# Patient Record
Sex: FEMALE | Race: WHITE | URBAN - METROPOLITAN AREA
[De-identification: names, ages, dates, MRNs, and addresses within clinical notes are randomized per-mention and may not be internally consistent; named-entity substitution may affect disease eponyms.]

---

## 2017-01-01 ENCOUNTER — TELEPHONE (OUTPATIENT)
Dept: FAMILY MEDICINE | Facility: CLINIC | Age: 77
End: 2017-01-01

## 2017-01-01 ENCOUNTER — OFFICE VISIT (OUTPATIENT)
Dept: FAMILY MEDICINE | Facility: CLINIC | Age: 77
End: 2017-01-01
Payer: MEDICARE

## 2017-01-01 VITALS
OXYGEN SATURATION: 96 % | HEART RATE: 76 BPM | TEMPERATURE: 98.8 F | BODY MASS INDEX: 18.18 KG/M2 | WEIGHT: 106.5 LBS | HEIGHT: 64 IN | SYSTOLIC BLOOD PRESSURE: 118 MMHG | DIASTOLIC BLOOD PRESSURE: 90 MMHG

## 2017-01-01 DIAGNOSIS — K21.9 GASTROESOPHAGEAL REFLUX DISEASE WITHOUT ESOPHAGITIS: ICD-10-CM

## 2017-01-01 DIAGNOSIS — F32.1 MODERATE SINGLE CURRENT EPISODE OF MAJOR DEPRESSIVE DISORDER (H): ICD-10-CM

## 2017-01-01 DIAGNOSIS — G89.3 CANCER ASSOCIATED PAIN: ICD-10-CM

## 2017-01-01 DIAGNOSIS — I48.91 ATRIAL FIBRILLATION, UNSPECIFIED TYPE (H): ICD-10-CM

## 2017-01-01 DIAGNOSIS — M79.662 PAIN OF LEFT LOWER LEG: ICD-10-CM

## 2017-01-01 DIAGNOSIS — E03.9 HYPOTHYROIDISM, UNSPECIFIED TYPE: ICD-10-CM

## 2017-01-01 DIAGNOSIS — K59.03 DRUG-INDUCED CONSTIPATION: ICD-10-CM

## 2017-01-01 DIAGNOSIS — F41.9 ANXIETY: ICD-10-CM

## 2017-01-01 DIAGNOSIS — G47.00 INSOMNIA, UNSPECIFIED TYPE: ICD-10-CM

## 2017-01-01 DIAGNOSIS — C34.92 MALIGNANT NEOPLASM OF LEFT LUNG, UNSPECIFIED PART OF LUNG (H): Primary | ICD-10-CM

## 2017-01-01 PROCEDURE — 99204 OFFICE O/P NEW MOD 45 MIN: CPT | Performed by: FAMILY MEDICINE

## 2017-01-01 RX ORDER — LOVASTATIN 40 MG
40 TABLET ORAL 2 TIMES DAILY WITH MEALS
COMMUNITY
End: 2017-01-01

## 2017-01-01 RX ORDER — ESOMEPRAZOLE MAGNESIUM 40 MG/1
40 CAPSULE, DELAYED RELEASE ORAL
COMMUNITY

## 2017-01-01 RX ORDER — PAROXETINE 30 MG/1
30 TABLET, FILM COATED ORAL EVERY MORNING
COMMUNITY

## 2017-01-01 RX ORDER — OXYCODONE AND ACETAMINOPHEN 7.5; 325 MG/1; MG/1
1 TABLET ORAL EVERY 6 HOURS PRN
Qty: 120 TABLET | Refills: 0 | Status: SHIPPED | OUTPATIENT
Start: 2017-01-01

## 2017-01-01 RX ORDER — LEVOTHYROXINE SODIUM 137 UG/1
137 TABLET ORAL DAILY
COMMUNITY

## 2017-01-01 RX ORDER — MIRTAZAPINE 15 MG/1
15 TABLET, FILM COATED ORAL AT BEDTIME
Qty: 30 TABLET | Refills: 1 | Status: SHIPPED | OUTPATIENT
Start: 2017-01-01

## 2017-01-01 RX ORDER — ALBUTEROL SULFATE 90 UG/1
2 AEROSOL, METERED RESPIRATORY (INHALATION) EVERY 4 HOURS PRN
COMMUNITY

## 2017-01-01 RX ORDER — MORPHINE SULFATE 15 MG/1
15 TABLET ORAL EVERY 12 HOURS
COMMUNITY
End: 2017-01-01

## 2017-01-01 RX ORDER — GABAPENTIN 300 MG/1
300 CAPSULE ORAL EVERY 8 HOURS
COMMUNITY

## 2017-01-01 RX ORDER — AMOXICILLIN 250 MG
1 CAPSULE ORAL 2 TIMES DAILY
Qty: 60 TABLET | Refills: 3 | Status: SHIPPED | OUTPATIENT
Start: 2017-01-01

## 2017-01-01 RX ORDER — NADOLOL 40 MG/1
40 TABLET ORAL DAILY
COMMUNITY

## 2017-01-01 RX ORDER — ALPRAZOLAM 0.5 MG
0.5 TABLET ORAL PRN
COMMUNITY
End: 2017-01-01

## 2017-01-01 RX ORDER — ALPRAZOLAM 0.5 MG
0.5 TABLET ORAL PRN
Qty: 30 TABLET | Refills: 0 | Status: SHIPPED | OUTPATIENT
Start: 2017-01-01

## 2017-01-01 RX ORDER — ONDANSETRON 4 MG/1
4 TABLET, FILM COATED ORAL EVERY 6 HOURS PRN
COMMUNITY

## 2017-01-01 RX ORDER — OXYCODONE AND ACETAMINOPHEN 5; 325 MG/1; MG/1
1 TABLET ORAL EVERY 6 HOURS PRN
COMMUNITY
End: 2017-01-01

## 2017-01-01 RX ORDER — MORPHINE SULFATE 30 MG/1
30 TABLET, FILM COATED, EXTENDED RELEASE ORAL EVERY 12 HOURS
Qty: 60 TABLET | Refills: 0 | Status: SHIPPED | OUTPATIENT
Start: 2017-01-01

## 2017-01-01 RX ORDER — DOCUSATE SODIUM 100 MG/1
100 CAPSULE, LIQUID FILLED ORAL DAILY
COMMUNITY
End: 2017-01-01

## 2017-07-20 NOTE — MR AVS SNAPSHOT
After Visit Summary   7/20/2017    Caroline Tyler    MRN: [id number]           Patient Information     Date Of Birth          1940        Visit Information        Provider Department      7/20/2017 10:30 AM Mikel Kent MD [facility]        Today's Diagnoses     Malignant neoplasm of left lung, unspecified part of lung (H)    -  1    Cancer associated pain        Drug-induced constipation        Moderate single current episode of major depressive disorder (H)        Atrial fibrillation, unspecified type (H)        Gastroesophageal reflux disease without esophagitis        Insomnia, unspecified type        Hypothyroidism, unspecified type        Anxiety        Pain of left lower leg           Follow-ups after your visit        Additional Services     HOSPICE REFERRAL       **Order classes of: [facility], [facility] and [facility] will route to the Home Care and Hospice Referral Pool.  Home Care or Hospice will then contact the patient to schedule their appointment.**    If you do not hear from Home Care and Hospice, or you would like to call to schedule, please call the referring place of service that your provider has listed below.  ______________________________________________________________________    Your provider has referred you to: [facility]: [facility] [phone]   [email or web address]    Extended Emergency Contact Information  Primary Emergency Contact: Tanner Tyler   [facility]  Home Phone: [phone]  Relation: Son    Patient Anticipated Discharge Date:    RN, PT, HHA to begin 24 - 48 hours after discharge.  PLEASE EVALUATE AND TREAT (Evaluation timeline is 24 - 48 hrs. Please call if there is need for a variance to this timeline).    REASON FOR REFERRAL: Hospice - Diagnosis: lung cancer    ADDITIONAL SERVICES NEEDED: HHA    OTHER PERTINENT INFORMATION: Patient was last seen by provider on 7/20/17  for metastatic lung cancer.    Current Outpatient Prescriptions:  ALPRAZolam (XANAX) 0.5 MG tablet, Take 0.5 mg by mouth as needed for anxiety, Disp: , Rfl:   oxyCODONE-acetaminophen (PERCOCET) 5-325 MG per tablet, Take 1 tablet by mouth every 6 hours as needed for moderate to severe pain, Disp: , Rfl:   morphine (MSIR) 15 MG IR tablet, Take 15 mg by mouth every 12 hours, Disp: , Rfl:   nadolol (CORGARD) 40 MG tablet, Take 40 mg by mouth daily, Disp: , Rfl:   levothyroxine (SYNTHROID) 137 MCG tablet, Take 137 mcg by mouth daily, Disp: , Rfl:   ondansetron (ZOFRAN) 4 MG tablet, Take 4 mg by mouth every 6 hours as needed for nausea, Disp: , Rfl:   docusate sodium (COLACE) 100 MG capsule, Take 100 mg by mouth daily, Disp: , Rfl:   PARoxetine (PAXIL) 30 MG tablet, Take 30 mg by mouth every morning, Disp: , Rfl:   gabapentin (NEURONTIN) 300 MG capsule, Take 300 mg by mouth every 8 hours, Disp: , Rfl:   esomeprazole (NEXIUM) 40 MG CR capsule, Take 40 mg by mouth every morning (before breakfast) Take 30-60 minutes before eating., Disp: , Rfl:   albuterol (PROAIR HFA/PROVENTIL HFA/VENTOLIN HFA) 108 (90 BASE) MCG/ACT Inhaler, Inhale 2 puffs into the lungs every 4 hours as needed for shortness of breath / dyspnea or wheezing, Disp: , Rfl:   senna-docusate (SENOKOT-S;PERICOLACE) 8.6-50 MG per tablet, Take 1 tablet by mouth 2 times daily, Disp: 60 tablet, Rfl: 3      There is no problem list on file for this patient.      Documentation of Face to Face and Certification for Home Health Services    I certify that patient, Caroline Tyler is under my care and that I, or a Nurse Practitioner or Physician's Assistant working with me, had a face-to-face encounter that meets the physician face-to-face encounter requirements with this patient on: 7/20/2017.    This encounter with the patient was in whole, or in part, for the following medical condition, which is the primary reason for Home Health Care:     I certify that, based on my  findings, the following services are medically necessary Home Health Services:     My clinical findings support the need for the above services because: metastatic lung cancer    Further, I certify that my clinical findings support that this patient is homebound (i.e. absences from home require considerable and taxing effort and are for medical reasons or [employer] services or infrequently or of short duration when for other reasons) because: end-stage metastatic lung cancer    Based on the above findings, I certify that this patient is confined to the home and needs intermittent skilled nursing care, physical therapy and/or speech therapy.  The patient is under my care, and I have initiated the establishment of the plan of care.  This patient will be followed by a physician who will periodically review the plan of care.    Physician/Provider to provide follow up care: Mikel Kent    [facility] certified Physician at time of discharge: Dr. Kent    Please be aware that coverage of these services is subject to the terms and limitations of your health insurance plan.  Call member services at your health plan with any benefit or coverage questions.                  Who to contact     If you have questions or need follow up information about today's clinic visit or your schedule please contact [facility] directly at [phone].  Normal or non-critical lab and imaging results will be communicated to you by MyChart, letter or phone within 4 business days after the clinic has received the results. If you do not hear from us within 7 days, please contact the clinic through MyChart or phone. If you have a critical or abnormal lab result, we will notify you by phone as soon as possible.  Submit refill requests through [employer] or call your pharmacy and they will forward the refill request to us. Please allow 3 business days for your refill to be completed.          Additional Information About  "Your Visit        [employer] Information     [employer] lets you send messages to your doctor, view your test results, renew your prescriptions, schedule appointments and more. To sign up, go to [email or web address] . Click on \"Log in\" on the left side of the screen, which will take you to the Welcome page. Then click on \"Sign up Now\" on the right side of the page.     You will be asked to enter the access code listed below, as well as some personal information. Please follow the directions to create your username and password.     Your access code is: [email or web address]  Expires: 10/18/2017 11:37 AM     Your access code will  in 90 days. If you need help or a new code, please call your [place] clinic or [phone].        Care EveryWhere ID     This is your Care EveryWhere ID. This could be used by other organizations to access your [place] medical records  [id number]        Your Vitals Were     Pulse Temperature Height Pulse Oximetry Breastfeeding? BMI (Body Mass Index)    76 98.8  F (37.1  C) 5' 4\" (1.626 m) 96% No 18.28 kg/m2       Blood Pressure from Last 3 Encounters:   17 118/90    Weight from Last 3 Encounters:   17 106 lb 8 oz (48.3 kg)              We Performed the Following     HOSPICE REFERRAL          Today's Medication Changes          These changes are accurate as of: 17 11:41 AM.  If you have any questions, ask your nurse or doctor.               Start taking these medicines.        Dose/Directions    mirtazapine 15 MG tablet   Commonly known as:  REMERON   Used for:  Moderate single current episode of major depressive disorder (H)   Started by:  Mikel Kent MD        Dose:  15 mg   Take 1 tablet (15 mg) by mouth At Bedtime   Quantity:  30 tablet   Refills:  1       morphine 30 MG 12 hr tablet   Commonly known as:  MS CONTIN   Used for:  Cancer associated pain   Replaces:  morphine 15 MG IR tablet   Started by:  Mikel Kent MD        Dose:  30 mg   Take 1 tablet (30 " mg) by mouth every 12 hours maximum 2 tablet(s) per day   Quantity:  60 tablet   Refills:  0       oxyCODONE-acetaminophen 7.5-325 MG per tablet   Commonly known as:  PERCOCET   Used for:  Cancer associated pain   Replaces:  oxyCODONE-acetaminophen 5-325 MG per tablet   Started by:  Mikel Kent MD        Dose:  1 tablet   Take 1 tablet by mouth every 6 hours as needed for pain maximum 4 tablet(s) per day   Quantity:  120 tablet   Refills:  0       senna-docusate 8.6-50 MG per tablet   Commonly known as:  SENOKOT-S;PERICOLACE   Used for:  Drug-induced constipation   Started by:  Mikel Kent MD        Dose:  1 tablet   Take 1 tablet by mouth 2 times daily   Quantity:  60 tablet   Refills:  3         Stop taking these medicines if you haven't already. Please contact your care team if you have questions.     COLACE 100 MG capsule   Generic drug:  docusate sodium   Stopped by:  Mikel Kent MD           lovastatin 40 MG tablet   Commonly known as:  MEVACOR   Stopped by:  Mikel Kent MD           morphine 15 MG IR tablet   Commonly known as:  MSIR   Replaced by:  morphine 30 MG 12 hr tablet   Stopped by:  Mikel Kent MD           oxyCODONE-acetaminophen 5-325 MG per tablet   Commonly known as:  PERCOCET   Replaced by:  oxyCODONE-acetaminophen 7.5-325 MG per tablet   Stopped by:  Mikel Kent MD                Where to get your medicines      These medications were sent to [facility] [address] [place] [address] [id number] [place] [address] AT [address] [address] [facility], [place] [address]     Phone:  [phone]     mirtazapine 15 MG tablet    senna-docusate 8.6-50 MG per tablet         Some of these will need a paper prescription and others can be bought over the counter.  Ask your nurse if you have questions.     Bring a paper prescription for each of these medications     morphine 30 MG 12 hr tablet    oxyCODONE-acetaminophen 7.5-325 MG per tablet                 Primary Care Provider Office Phone # Fax #    Mikel Kent [phone]       [facility] [address]  [facility] [address]        Equal Access to Services     VIOLETTA KAISER : Hadii vianca ku hadamisho Sokillianali, waaxda luqadaha, qaybta kaalmada adeegyada, janet bernaln feliz connelly sharmaine olsen. So [facility] [phone].    ATENCIÓN: Si habla español, tiene a scott disposición servicios gratuitos de asistencia lingüística. Llame al [phone].    We comply with applicable federal civil rights laws and [place] laws. We do not discriminate on the basis of race, color, national origin, age, disability sex, sexual orientation or gender identity.            Thank you!     Thank you for choosing [facility]  for your care. Our goal is always to provide you with excellent care. Hearing back from our patients is one way we can continue to improve our services. Please take a few minutes to complete the written survey that you may receive in the mail after your visit with us. Thank you!             Your Updated Medication List - Protect others around you: Learn how to safely use, store and throw away your medicines at www.disposemymeds.org.          This list is accurate as of: 7/20/17 11:41 AM.  Always use your most recent med list.                   Brand Name Dispense Instructions for use Diagnosis    albuterol 108 (90 BASE) MCG/ACT Inhaler    PROAIR HFA/PROVENTIL HFA/VENTOLIN HFA     Inhale 2 puffs into the lungs every 4 hours as needed for shortness of breath / dyspnea or wheezing    Malignant neoplasm of left lung, unspecified part of lung (H)       ALPRAZolam 0.5 MG tablet    XANAX     Take 0.5 mg by mouth as needed for anxiety    Drug-induced constipation       esomeprazole 40 MG CR capsule    nexIUM     Take 40 mg by mouth every morning (before breakfast) Take 30-60 minutes before eating.    Gastroesophageal reflux disease without esophagitis       gabapentin 300 MG  capsule    NEURONTIN     Take 300 mg by mouth every 8 hours    Pain of left lower leg       mirtazapine 15 MG tablet    REMERON    30 tablet    Take 1 tablet (15 mg) by mouth At Bedtime    Moderate single current episode of major depressive disorder (H)       morphine 30 MG 12 hr tablet    MS CONTIN    60 tablet    Take 1 tablet (30 mg) by mouth every 12 hours maximum 2 tablet(s) per day    Cancer associated pain       nadolol 40 MG tablet    CORGARD     Take 40 mg by mouth daily    Atrial fibrillation, unspecified type (H)       oxyCODONE-acetaminophen 7.5-325 MG per tablet    PERCOCET    120 tablet    Take 1 tablet by mouth every 6 hours as needed for pain maximum 4 tablet(s) per day    Cancer associated pain       PARoxetine 30 MG tablet    PAXIL     Take 30 mg by mouth every morning    Moderate single current episode of major depressive disorder (H)       senna-docusate 8.6-50 MG per tablet    SENOKOT-S;PERICOLACE    60 tablet    Take 1 tablet by mouth 2 times daily    Drug-induced constipation       SYNTHROID 137 MCG tablet   Generic drug:  levothyroxine      Take 137 mcg by mouth daily    Hypothyroidism, unspecified type       ZOFRAN 4 MG tablet   Generic drug:  ondansetron      Take 4 mg by mouth every 6 hours as needed for nausea    Malignant neoplasm of left lung, unspecified part of lung (H)

## 2017-07-20 NOTE — NURSING NOTE
"Chief Complaint   Patient presents with     Establish Care       Initial /90 (BP Location: Right arm, Patient Position: Chair, Cuff Size: Adult Regular)  Pulse 76  Temp 98.8  F (37.1  C)  Ht 5' 4\" (1.626 m)  Wt 106 lb 8 oz (48.3 kg)  SpO2 96%  Breastfeeding? No  BMI 18.28 kg/m2 Estimated body mass index is 18.28 kg/(m^2) as calculated from the following:    Height as of this encounter: 5' 4\" (1.626 m).    Weight as of this encounter: 106 lb 8 oz (48.3 kg).  Medication Reconciliation: complete       KA    "

## 2017-07-20 NOTE — TELEPHONE ENCOUNTER
Call from pharmacy family trying to fill RX for percocet and alprazolam.  Both are too soon to fill.     Oxy fill 7/6 # 120 with same sig  Alprazolam 6/28 # 60 1 BID    Family does admit to giving extra doses of the pain meds.     Per provider - ok to fill oxy today but needs to last 30 days or be seen to discuss pain.   Alprazolam 1-2 tabs daily as needed for anxiety    Pharmacy notified and they will discuss usage with family.    Gaby Todd RN    Message handled by Nurse Triage with [facility] - provider name: Faith Campbell MD .

## 2017-07-24 NOTE — PROGRESS NOTES
SUBJECTIVE:                                                    Caroline Tyler is a 77 year old female who presents to clinic today for the following health issues:    Patient presents with:  Establish Care    Patient here to establish care moving from [place] to be closer to her son. Patient is planning to connect with hospice as she has lung cancer with multiple metastatic lesions in the left lung. Patient with PET/CT 6/23/17 with hypermetabolic left perihilar mass consistent with malignancy and other nodular densities in the left lung as well as a moderate to large left pleural effusion. Patient is in quite a bit of pain today. She has been using pain medication with morphine sulfate 30 mg ER tablets BID as well as oxycodone 7.5 mg/325 mg tablets 1-2 tablets every 4 hours as needed for pain. Despite this regimen she continues to be in quite a bit of pain in the left lung.  Apparently has been taking these pain medications at increased interval than prescribed due to pain and this has caused her to be lethargic at times. Patient is clear about her goals of care at this time which is consistent with just controlling pain and symptoms and would like to be enrolled in hospice.    Patient with history of atrial fibrillation and has been maintained on beta blocker.    History of depression currently on Paxil.    History of GERD on PPI.    Significant anxiety with cancer issues and using SSRI as well as occasional alprazolam.    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    History of hypothyroidism on levothyroxine.    There is no problem list on file for this patient.    No past surgical history on file.    Social History   Substance Use Topics     Smoking status: Former Smoker     Smokeless tobacco: Never Used     Alcohol use No     No family history on file.   "    ROS:  RESP: Mild occasional shortness of breath but patient is fairly inactive so primarily concerned with chest wall pain  CV: Chest wall pain as noted, denies palpitations    OBJECTIVE:                                                    /90 (BP Location: Right arm, Patient Position: Chair, Cuff Size: Adult Regular)  Pulse 76  Temp 98.8  F (37.1  C)  Ht 5' 4\" (1.626 m)  Wt 106 lb 8 oz (48.3 kg)  SpO2 96%  Breastfeeding? No  BMI 18.28 kg/m2Body mass index is 18.28 kg/(m^2).  GENERAL APPEARANCE: healthy, alert and no distress     ASSESSMENT/PLAN:                                                    1. Malignant neoplasm of left lung, unspecified part of lung (H)  Patient is appropriate for hospice care at this time with uncontrolled symptoms of anxiety and pain we would like to get this started as soon as possible. Patient also with a dyspnea/air hunger issue.  I would not recommend further workup with pleural effusion or treatment as patient has clear goals of care including alleviation of unwanted symptoms. Recommended hospice, pain medication, anxiety medication, nausea medication.  - ondansetron (ZOFRAN) 4 MG tablet; Take 4 mg by mouth every 6 hours as needed for nausea  - albuterol (PROAIR HFA/PROVENTIL HFA/VENTOLIN HFA) 108 (90 BASE) MCG/ACT Inhaler; Inhale 2 puffs into the lungs every 4 hours as needed for shortness of breath / dyspnea or wheezing  - HOSPICE REFERRAL    2. Cancer associated pain  - morphine (MS CONTIN) 30 MG 12 hr tablet; Take 1 tablet (30 mg) by mouth every 12 hours maximum 2 tablet(s) per day  Dispense: 60 tablet; Refill: 0  - oxyCODONE-acetaminophen (PERCOCET) 7.5-325 MG per tablet; Take 1 tablet by mouth every 6 hours as needed for pain maximum 4 tablet(s) per day  Dispense: 120 tablet; Refill: 0    3. Drug-induced constipation  - senna-docusate (SENOKOT-S;PERICOLACE) 8.6-50 MG per tablet; Take 1 tablet by mouth 2 times daily  Dispense: 60 tablet; Refill: 3  - ALPRAZolam " (XANAX) 0.5 MG tablet; Take 1 tablet (0.5 mg) by mouth as needed for anxiety  Dispense: 30 tablet; Refill: 0    4. Moderate single current episode of major depressive disorder (H)  - PARoxetine (PAXIL) 30 MG tablet; Take 30 mg by mouth every morning  - mirtazapine (REMERON) 15 MG tablet; Take 1 tablet (15 mg) by mouth At Bedtime  Dispense: 30 tablet; Refill: 1    5. Atrial fibrillation, unspecified type (H)  - nadolol (CORGARD) 40 MG tablet; Take 40 mg by mouth daily    6. Gastroesophageal reflux disease without esophagitis  - esomeprazole (NEXIUM) 40 MG CR capsule; Take 40 mg by mouth every morning (before breakfast) Take 30-60 minutes before eating.    7. Insomnia, unspecified type    8. Hypothyroidism, unspecified type  - levothyroxine (SYNTHROID) 137 MCG tablet; Take 137 mcg by mouth daily    9. Anxiety    10. Pain of left lower leg  - gabapentin (NEURONTIN) 300 MG capsule; Take 300 mg by mouth every 8 hours    Greater than 45 minutes spent with patient and family discussing risks and benefits and management with possible outcomes regarding this issue with greater than 50% in counseling for medical decision making and coordination of care.    Mikel Kent MD  [facility]